# Patient Record
Sex: MALE | Race: WHITE | URBAN - METROPOLITAN AREA
[De-identification: names, ages, dates, MRNs, and addresses within clinical notes are randomized per-mention and may not be internally consistent; named-entity substitution may affect disease eponyms.]

---

## 2019-07-30 ENCOUNTER — HOSPITAL ENCOUNTER (EMERGENCY)
Age: 29
Discharge: LEFT BEFORE DISCHARGE | End: 2019-07-30
Payer: MEDICARE

## 2019-08-30 ENCOUNTER — HOSPITAL ENCOUNTER (EMERGENCY)
Age: 29
Discharge: LEFT BEFORE DISCHARGE | End: 2019-08-30
Attending: EMERGENCY MEDICINE
Payer: MEDICARE

## 2019-08-30 VITALS
OXYGEN SATURATION: 98 % | DIASTOLIC BLOOD PRESSURE: 69 MMHG | BODY MASS INDEX: 22.9 KG/M2 | RESPIRATION RATE: 19 BRPM | HEART RATE: 81 BPM | HEIGHT: 70 IN | TEMPERATURE: 98 F | WEIGHT: 160 LBS | SYSTOLIC BLOOD PRESSURE: 119 MMHG

## 2019-08-30 DIAGNOSIS — M79.672 BILATERAL FOOT PAIN: Primary | ICD-10-CM

## 2019-08-30 DIAGNOSIS — M79.671 BILATERAL FOOT PAIN: Primary | ICD-10-CM

## 2019-08-30 PROCEDURE — 99282 EMERGENCY DEPT VISIT SF MDM: CPT

## 2019-08-30 RX ORDER — QUETIAPINE FUMARATE 100 MG/1
100 TABLET, FILM COATED ORAL DAILY
COMMUNITY

## 2019-08-30 ASSESSMENT — PAIN DESCRIPTION - ORIENTATION: ORIENTATION: LEFT;RIGHT

## 2019-08-30 ASSESSMENT — PAIN DESCRIPTION - PAIN TYPE: TYPE: ACUTE PAIN

## 2019-08-30 ASSESSMENT — PAIN DESCRIPTION - LOCATION: LOCATION: FOOT

## 2019-08-30 ASSESSMENT — PAIN SCALES - GENERAL: PAINLEVEL_OUTOF10: 6

## 2021-06-06 ENCOUNTER — HOSPITAL ENCOUNTER (EMERGENCY)
Age: 31
Discharge: LEFT BEFORE DISCHARGE | End: 2021-06-06
Payer: MEDICARE

## 2021-06-06 VITALS
RESPIRATION RATE: 16 BRPM | OXYGEN SATURATION: 99 % | BODY MASS INDEX: 22.9 KG/M2 | SYSTOLIC BLOOD PRESSURE: 135 MMHG | HEIGHT: 70 IN | WEIGHT: 160 LBS | HEART RATE: 79 BPM | DIASTOLIC BLOOD PRESSURE: 80 MMHG | TEMPERATURE: 98.5 F

## 2021-06-07 NOTE — ED TRIAGE NOTES
Mode of arrival (squad #, walk in, police, etc) : walk in        Chief complaint(s): Abscess        Arrival Note (brief scenario, treatment PTA, etc). : Pt states he had an abscess on his left forearm that he tried to drain at home. Pt states he has been cleaning it with peroxide and it is continuing to drain. C= \"Have you ever felt that you should Cut down on your drinking? \"  No  A= \"Have people Annoyed you by criticizing your drinking? \"  No  G= \"Have you ever felt bad or Guilty about your drinking? \"  No  E= \"Have you ever had a drink as an Eye-opener first thing in the morning to steady your nerves or to help a hangover? \"  No      Deferred []      Reason for deferring: N/A    *If yes to two or more: probable alcohol abuse. *

## 2022-04-07 ENCOUNTER — TELEPHONE (OUTPATIENT)
Dept: GASTROENTEROLOGY | Age: 32
End: 2022-04-07

## 2022-04-07 NOTE — TELEPHONE ENCOUNTER
Received referral for np appt. Called pt to [facility] appt, lvm to call back.    Positive hepatitis C antibody test - [employer]

## 2024-03-03 ENCOUNTER — HOSPITAL ENCOUNTER (EMERGENCY)
Age: 34
Discharge: HOME | End: 2024-03-03
Attending: EMERGENCY MEDICINE
Payer: MEDICAID

## 2024-03-03 VITALS
BODY MASS INDEX: 22.9 KG/M2 | DIASTOLIC BLOOD PRESSURE: 60 MMHG | HEART RATE: 68 BPM | TEMPERATURE: 98 F | SYSTOLIC BLOOD PRESSURE: 106 MMHG | WEIGHT: 160 LBS | HEIGHT: 70 IN | RESPIRATION RATE: 20 BRPM | OXYGEN SATURATION: 96 %

## 2024-03-03 DIAGNOSIS — T15.91XA FOREIGN BODY OF RIGHT EXTERNAL EYE, INITIAL ENCOUNTER: Primary | ICD-10-CM

## 2024-03-03 PROCEDURE — 6370000000 HC RX 637 (ALT 250 FOR IP)

## 2024-03-03 PROCEDURE — 65222 REMOVE FOREIGN BODY FROM EYE: CPT

## 2024-03-03 PROCEDURE — 99283 EMERGENCY DEPT VISIT LOW MDM: CPT

## 2024-03-03 RX ORDER — IBUPROFEN 400 MG/1
600 TABLET ORAL ONCE
Status: COMPLETED | OUTPATIENT
Start: 2024-03-03 | End: 2024-03-03

## 2024-03-03 RX ORDER — ERYTHROMYCIN 5 MG/G
OINTMENT OPHTHALMIC
Qty: 3.5 G | Refills: 0 | Status: SHIPPED | OUTPATIENT
Start: 2024-03-03 | End: 2024-03-13

## 2024-03-03 RX ORDER — TETRACAINE HYDROCHLORIDE 5 MG/ML
1 SOLUTION OPHTHALMIC ONCE
Status: COMPLETED | OUTPATIENT
Start: 2024-03-03 | End: 2024-03-03

## 2024-03-03 RX ADMIN — TETRACAINE HYDROCHLORIDE 1 DROP: 5 SOLUTION OPHTHALMIC at 21:48

## 2024-03-03 RX ADMIN — FLUORESCEIN SODIUM 1 MG: 1 STRIP OPHTHALMIC at 21:48

## 2024-03-03 RX ADMIN — IBUPROFEN 600 MG: 400 TABLET, FILM COATED ORAL at 21:47

## 2024-03-03 ASSESSMENT — PAIN - FUNCTIONAL ASSESSMENT: PAIN_FUNCTIONAL_ASSESSMENT: 0-10

## 2024-03-03 ASSESSMENT — PAIN SCALES - GENERAL: PAINLEVEL_OUTOF10: 3

## 2024-03-04 ASSESSMENT — ENCOUNTER SYMPTOMS
PHOTOPHOBIA: 1
EYE PAIN: 1
EYE REDNESS: 1

## 2024-03-04 NOTE — ED PROVIDER NOTES
[facility] ED  Emergency Department Encounter  Emergency Medicine Resident     Pt Name:Cassandra Walls  MRN: [id number]  Birthdate 1990  Date of evaluation: 3/3/24  PCP:  No primary care provider on file.  Note Started: 9:29 PM EST      CHIEF COMPLAINT       Chief Complaint   Patient presents with    Eye Injury     Rt eye injury, metal shrapnel       HISTORY OF PRESENT ILLNESS  (Location/Symptom, Timing/Onset, Context/Setting, Quality, Duration, Modifying Factors, Severity.)      Cassandra Walls is a 33 y.o. male who presents with right eye pain.  Patient states that he was grinding a piece of metal shortly before arrival when a piece of metal flew off and possibly hit him in the eye.  Patient feels like there is a foreign body in his eye.  Denies any blurry vision, pain with extraocular movements, headache.  Patient states that after the incident he went home tried to flush his eye but was unable to get any pain relief so he presented to the emergency department.    PAST MEDICAL / SURGICAL / SOCIAL / FAMILY HISTORY      has a past medical history of Rectal bleed.       has a past surgical history that includes Mandible fracture surgery (6/24/2008); Mandible fracture surgery (11/17/2008); Mandible fracture surgery; Lung surgery; fracture surgery; fracture surgery (Bilateral, 06-); and other surgical history (07-).      Social History     Socioeconomic History    Marital status: Single     Spouse name: Not on file    Number of children: Not on file    Years of education: Not on file    Highest education level: Not on file   Occupational History    Not on file   Tobacco Use    Smoking status: Every Day     Current packs/day: 1.00     Average packs/day: 1 pack/day for 13.0 years (13.0 ttl pk-yrs)     Types: Cigarettes    Smokeless tobacco: Not on file   Substance and Sexual Activity    Alcohol use: Yes     Comment: rarely    Drug use: No    Sexual activity: Not on file   Other Topics  Event: C/o pain to left leg 8/10 requesting meds stronger than Tylenol  Vital Signs Last 24 Hrs  T(C): 36.8 (07 Oct 2019 19:34), Max: 37.2 (07 Oct 2019 11:24)  T(F): 98.3 (07 Oct 2019 19:34), Max: 99 (07 Oct 2019 11:24)  HR: 84 (07 Oct 2019 19:34) (82 - 86)  BP: 156/88 (07 Oct 2019 19:34) (112/67 - 156/88)  BP(mean): --  RR: 18 (07 Oct 2019 19:34) (17 - 18)  SpO2: 100% (07 Oct 2019 19:34) (97% - 100%) Event: C/o pain to left leg 8/10 requesting meds stronger than Tylenol  Vital Signs Last 24 Hrs  T(C): 36.8 (07 Oct 2019 19:34), Max: 37.2 (07 Oct 2019 11:24)  T(F): 98.3 (07 Oct 2019 19:34), Max: 99 (07 Oct 2019 11:24)  HR: 84 (07 Oct 2019 19:34) (82 - 86)  BP: 156/88 (07 Oct 2019 19:34) (112/67 - 156/88)  BP(mean): --  RR: 18 (07 Oct 2019 19:34) (17 - 18)  SpO2: 100% (07 Oct 2019 19:34) (97% - 100%)    Problem: Pain to LLE probably due to Event: C/o pain to left leg 8/10 requesting meds stronger than Tylenol, refusing tramadol and talking loudly threatening to sign AMA.  Vital Signs Last 24 Hrs  T(C): 36.8 (07 Oct 2019 19:34), Max: 37.2 (07 Oct 2019 11:24)  T(F): 98.3 (07 Oct 2019 19:34), Max: 99 (07 Oct 2019 11:24)  HR: 84 (07 Oct 2019 19:34) (82 - 86)  BP: 156/88 (07 Oct 2019 19:34) (112/67 - 156/88)  BP(mean): --  RR: 18 (07 Oct 2019 19:34) (17 - 18)  SpO2: 100% (07 Oct 2019 19:34) (97% - 100%)    Problem: Pain to LLE probably due to Lt leg Cellulitis  1. HYDROmorphone  Injectable 0.5 milliGRAM(s) IV Push Once  2. Cont CefTRIAXone   IVPB 1000 milliGRAM(s) IV Intermittent every 24 hours  3. Cont Methocarbamol 500 milliGRAM(s) Oral every 12 hours PRN spasms  4. Cont traMADol 50 milliGRAM(s) Oral every 6 hours PRN Severe Pain (7 - 10)  5. F/u with pain management in AM

## 2024-03-04 NOTE — DISCHARGE INSTRUCTIONS
Seen in the emergency department for pain in your eye.  Most likely cause of your symptoms is a small retained foreign body from a piece of metal.  Will give you a prescription for eyedrops, please use these for the next few days.    I have included the contact information for an eye doctor, you can call to schedule appointment if you have any worsening symptoms or come back to the emergency department.    For pain use ibuprofen (Motrin / Advil) or acetaminophen (Tylenol), unless prescribed medications that have acetaminophen in it.  You can take over the counter acetaminophen tablets (1 - 2 tablets of the 500-mg strength every 6 hours) or ibuprofen tablets (2 tablets every 4 hours).      PLEASE RETURN TO THE EMERGENCY DEPARTMENT IMMEDIATELY for worsening symptoms, swelling or drainage from the eye, the white of your eye turns red, inability to see, or if you develop any concerning symptoms such as: high fever not relieved by acetaminophen (Tylenol) and/or ibuprofen (Motrin / Advil), chills, shortness of breath, chest pain, feeling of your heart fluttering or racing, persistent nausea and/or vomiting, vomiting up blood, blood in your stool, numbness, loss of consciousness, weakness or tingling in the arms or legs or change in color of the extremities, changes in mental status, persistent headache, blurry vision, loss of bladder / bowel control, unable to follow up with your physician, or other any other care or concern.

## 2024-03-04 NOTE — ED PROVIDER NOTES
[facility]     Emergency Department     Faculty Attestation    I performed a history and physical examination of the patient and discussed management with the resident. I reviewed the resident’s note and agree with the documented findings and plan of care. Any areas of disagreement are noted on the chart. I was personally present for the key portions of any procedures. I have documented in the chart those procedures where I was not present during the key portions. I have reviewed the emergency nurses triage note. I agree with the chief complaint, past medical history, past surgical history, allergies, medications, social and family history as documented unless otherwise noted below.        For Physician Assistant/ Nurse Practitioner cases/documentation I have personally evaluated this patient and have completed at least one if not all key elements of the E/M (history, physical exam, and MDM). Additional findings are as noted.  I have personally seen and evaluated the patient.  I find the patient's history and physical exam are consistent with the NP/PA documentation.  I agree with the care provided, treatment rendered, disposition and follow-up plan.    Patient sustained foreign body to right eye prior to arrival.  The patient's eye is injected on gross exam I see no evidence of corneal foreign body however and eversion of the eyelid does reveal a small metallic foreign body which is easily washed out and.  The patient will be stained for corneal abrasion antibiotics follow-up advised      Critical Care     Patricio Nagel M.D.  Attending Emergency  Physician           Patricio Nagel MD  03/03/24 [id number]